# Patient Record
Sex: MALE | Race: WHITE | Employment: OTHER | URBAN - METROPOLITAN AREA
[De-identification: names, ages, dates, MRNs, and addresses within clinical notes are randomized per-mention and may not be internally consistent; named-entity substitution may affect disease eponyms.]

---

## 2017-03-10 ENCOUNTER — OFFICE VISIT (OUTPATIENT)
Dept: FAMILY MEDICINE CLINIC | Facility: CLINIC | Age: 47
End: 2017-03-10

## 2017-03-10 VITALS
WEIGHT: 299 LBS | BODY MASS INDEX: 43 KG/M2 | RESPIRATION RATE: 16 BRPM | TEMPERATURE: 98 F | HEART RATE: 76 BPM | SYSTOLIC BLOOD PRESSURE: 128 MMHG | DIASTOLIC BLOOD PRESSURE: 86 MMHG

## 2017-03-10 DIAGNOSIS — R05.3 PERSISTENT COUGH: Primary | ICD-10-CM

## 2017-03-10 DIAGNOSIS — R50.9 FEVER, UNSPECIFIED FEVER CAUSE: ICD-10-CM

## 2017-03-10 PROCEDURE — 99213 OFFICE O/P EST LOW 20 MIN: CPT | Performed by: FAMILY MEDICINE

## 2017-03-10 RX ORDER — ACYCLOVIR 200 MG/1
200 CAPSULE ORAL
Qty: 25 CAPSULE | Refills: 1 | Status: SHIPPED | OUTPATIENT
Start: 2017-03-10 | End: 2017-12-05 | Stop reason: ALTCHOICE

## 2017-03-10 RX ORDER — ACYCLOVIR 200 MG/1
200 CAPSULE ORAL
COMMUNITY
End: 2017-03-10

## 2017-03-10 NOTE — PROGRESS NOTES
Pablo Epley is a 48year old male. HPI:   Patient states that he has had a dry cough for approximately 2-1/2 weeks. He said it started with an upper respiratory infection and low-grade fever. Cough can interfere with his talking.   He asked for refill this Visit:  Signed Prescriptions Disp Refills    acyclovir 200 MG Oral Cap 25 capsule 1      Sig: Take 1 capsule (200 mg total) by mouth 5 (five) times daily.       Mometasone Furoate (ASMANEX 60 METERED DOSES) 220 MCG/INH Inhalation Aerosol Powder, Breath

## 2017-03-24 ENCOUNTER — OFFICE VISIT (OUTPATIENT)
Dept: FAMILY MEDICINE CLINIC | Facility: CLINIC | Age: 47
End: 2017-03-24

## 2017-03-24 VITALS
BODY MASS INDEX: 43.23 KG/M2 | WEIGHT: 302 LBS | RESPIRATION RATE: 16 BRPM | DIASTOLIC BLOOD PRESSURE: 80 MMHG | TEMPERATURE: 98 F | SYSTOLIC BLOOD PRESSURE: 120 MMHG | HEART RATE: 96 BPM | HEIGHT: 70 IN

## 2017-03-24 DIAGNOSIS — J02.9 PHARYNGITIS, UNSPECIFIED ETIOLOGY: Primary | ICD-10-CM

## 2017-03-24 LAB — CONTROL LINE PRESENT WITH A CLEAR BACKGROUND (YES/NO): YES YES/NO

## 2017-03-24 PROCEDURE — 99213 OFFICE O/P EST LOW 20 MIN: CPT | Performed by: FAMILY MEDICINE

## 2017-03-24 PROCEDURE — 87880 STREP A ASSAY W/OPTIC: CPT | Performed by: FAMILY MEDICINE

## 2017-03-24 PROCEDURE — 87081 CULTURE SCREEN ONLY: CPT | Performed by: FAMILY MEDICINE

## 2017-03-24 NOTE — PROGRESS NOTES
HPI:   Lilly Salcido is a 48year old male who presents for upper respiratory symptoms for  5  days. Patient reports sore throat, congestion, denies fever, denies cough, denies sinus pain.       Current Outpatient Prescriptions:  acyclovir 200 MG Oral Cap T are clear  HEENT: atraumatic, normocephalic,ears and throat are clear; no maxillary and frontal sinus tenderness  NECK: supple,no adenopathy  LUNGS: clear to auscultation, no r/r/w  CARDIO: RRR without murmur  GI: good BS's,no masses, HSM or tenderness

## 2017-03-24 NOTE — PATIENT INSTRUCTIONS
Pharyngitis (Sore Throat), Report Pending    Pharyngitis (sore throat) is often due to a virus. It can also be caused by the streptococcus, or strep, bacterium, often called strep throat.  Both viral and strep infections can cause throat pain that is wors · For children: Use acetaminophen for fever, fussiness, or discomfort.  In infants older than 10months of age, you may use ibuprofen instead of acetaminophen. Talk with your child's healthcare provider before giving these medicines if your child has chronic · Signs of dehydration (very dark urine or no urine, sunken eyes, dizziness)  · Trouble breathing or noisy breathing  · Muffled voice  · New rash  · Child appears to be getting sicker  Date Last Reviewed: 4/13/2015  © 1273-6211 [employer]. 7

## 2017-10-04 ENCOUNTER — TELEPHONE (OUTPATIENT)
Dept: FAMILY MEDICINE CLINIC | Facility: CLINIC | Age: 47
End: 2017-10-04

## 2017-10-04 DIAGNOSIS — Z13.0 SCREENING FOR ENDOCRINE, METABOLIC AND IMMUNITY DISORDER: ICD-10-CM

## 2017-10-04 DIAGNOSIS — Z13.220 SCREENING FOR LIPOID DISORDERS: ICD-10-CM

## 2017-10-04 DIAGNOSIS — Z13.29 SCREENING FOR ENDOCRINE, METABOLIC AND IMMUNITY DISORDER: ICD-10-CM

## 2017-10-04 DIAGNOSIS — Z13.228 SCREENING FOR ENDOCRINE, METABOLIC AND IMMUNITY DISORDER: ICD-10-CM

## 2017-10-04 DIAGNOSIS — Z12.5 SPECIAL SCREENING FOR MALIGNANT NEOPLASM OF PROSTATE: ICD-10-CM

## 2017-10-04 DIAGNOSIS — Z13.0 SCREENING FOR IRON DEFICIENCY ANEMIA: Primary | ICD-10-CM

## 2017-10-04 DIAGNOSIS — Z13.89 SCREENING FOR GENITOURINARY CONDITION: ICD-10-CM

## 2017-10-04 NOTE — TELEPHONE ENCOUNTER
Pt wife, Lexii Moran (on pt HIPAA), set up a physical for pt 12/5/17. Pt would like to complete labs prior to appt - please place needed labs and please contact pt/wife to let them know when orders are placed and if they are fasting.  Labs can be ordered through

## 2017-10-04 NOTE — TELEPHONE ENCOUNTER
I have pended labs: cbc, cmp, lipid, psa, and u/a (labs Dr Marcello Brooke does for px's)    Please sign if you agree. Thanks.

## 2017-12-05 ENCOUNTER — OFFICE VISIT (OUTPATIENT)
Dept: FAMILY MEDICINE CLINIC | Facility: CLINIC | Age: 47
End: 2017-12-05

## 2017-12-05 VITALS
BODY MASS INDEX: 45.61 KG/M2 | DIASTOLIC BLOOD PRESSURE: 80 MMHG | HEIGHT: 69.5 IN | SYSTOLIC BLOOD PRESSURE: 130 MMHG | TEMPERATURE: 98 F | RESPIRATION RATE: 16 BRPM | HEART RATE: 80 BPM | WEIGHT: 315 LBS

## 2017-12-05 DIAGNOSIS — Z13.220 SCREENING FOR LIPOID DISORDERS: ICD-10-CM

## 2017-12-05 DIAGNOSIS — Z13.228 SCREENING FOR ENDOCRINE, METABOLIC AND IMMUNITY DISORDER: ICD-10-CM

## 2017-12-05 DIAGNOSIS — E66.01 MORBID OBESITY DUE TO EXCESS CALORIES (HCC): ICD-10-CM

## 2017-12-05 DIAGNOSIS — F41.8 ANXIETY WITH DEPRESSION: ICD-10-CM

## 2017-12-05 DIAGNOSIS — Z12.5 SPECIAL SCREENING FOR MALIGNANT NEOPLASM OF PROSTATE: ICD-10-CM

## 2017-12-05 DIAGNOSIS — Z13.0 SCREENING FOR IRON DEFICIENCY ANEMIA: ICD-10-CM

## 2017-12-05 DIAGNOSIS — Z13.29 SCREENING FOR ENDOCRINE, METABOLIC AND IMMUNITY DISORDER: ICD-10-CM

## 2017-12-05 DIAGNOSIS — Z00.00 ANNUAL PHYSICAL EXAM: Primary | ICD-10-CM

## 2017-12-05 DIAGNOSIS — Z13.89 SCREENING FOR GENITOURINARY CONDITION: ICD-10-CM

## 2017-12-05 DIAGNOSIS — G47.33 OSA (OBSTRUCTIVE SLEEP APNEA): ICD-10-CM

## 2017-12-05 DIAGNOSIS — Z13.0 SCREENING FOR ENDOCRINE, METABOLIC AND IMMUNITY DISORDER: ICD-10-CM

## 2017-12-05 PROCEDURE — 99396 PREV VISIT EST AGE 40-64: CPT | Performed by: FAMILY MEDICINE

## 2017-12-05 NOTE — PROGRESS NOTES
Liz Cline is a 50year old male who presents for a complete physical exam.   HPI:   Pt complains of nothing new. Stopped using his dental appliance for MIRIAM. . States it kept falling out. Discussed the benefit of adequate treatment for MIRIAM.   Patient wi Diagnosis Date   • Anxiety state, unspecified    • Obesity, unspecified    • Unspecified sleep apnea        PAST SURGICAL HISTORY:     Past Surgical History:  1998: APPENDECTOMY  1985: KNEE SCOPE/SURG/INCOND FX AID+FIXAT      Comment: knee arthroscopy Pulse exam Right:  pedal 2+. Pulse exam Left:    pedal 2+.    Pretibial edema: No lymphadema in the legs or feet:  GI: good BS's,no masses, HSM or tenderness  : two descended testes,no masses,no hernia,no penile lesions  RECTAL: good rectal t

## 2017-12-07 ENCOUNTER — LAB ENCOUNTER (OUTPATIENT)
Dept: LAB | Age: 47
End: 2017-12-07
Attending: FAMILY MEDICINE
Payer: COMMERCIAL

## 2017-12-07 DIAGNOSIS — Z13.0 SCREENING FOR ENDOCRINE, METABOLIC AND IMMUNITY DISORDER: ICD-10-CM

## 2017-12-07 DIAGNOSIS — Z12.5 SPECIAL SCREENING FOR MALIGNANT NEOPLASM OF PROSTATE: ICD-10-CM

## 2017-12-07 DIAGNOSIS — Z13.220 SCREENING FOR LIPOID DISORDERS: ICD-10-CM

## 2017-12-07 DIAGNOSIS — Z13.0 SCREENING FOR IRON DEFICIENCY ANEMIA: ICD-10-CM

## 2017-12-07 DIAGNOSIS — Z13.89 SCREENING FOR GENITOURINARY CONDITION: ICD-10-CM

## 2017-12-07 DIAGNOSIS — Z13.29 SCREENING FOR ENDOCRINE, METABOLIC AND IMMUNITY DISORDER: ICD-10-CM

## 2017-12-07 DIAGNOSIS — E66.01 MORBID OBESITY DUE TO EXCESS CALORIES (HCC): ICD-10-CM

## 2017-12-07 DIAGNOSIS — Z13.228 SCREENING FOR ENDOCRINE, METABOLIC AND IMMUNITY DISORDER: ICD-10-CM

## 2017-12-07 PROBLEM — G47.33 OSA (OBSTRUCTIVE SLEEP APNEA): Status: ACTIVE | Noted: 2017-12-07

## 2017-12-07 PROBLEM — F41.8 ANXIETY WITH DEPRESSION: Status: ACTIVE | Noted: 2017-12-07

## 2017-12-07 PROCEDURE — 80053 COMPREHEN METABOLIC PANEL: CPT | Performed by: FAMILY MEDICINE

## 2017-12-07 PROCEDURE — 85025 COMPLETE CBC W/AUTO DIFF WBC: CPT | Performed by: FAMILY MEDICINE

## 2017-12-07 PROCEDURE — 81003 URINALYSIS AUTO W/O SCOPE: CPT | Performed by: FAMILY MEDICINE

## 2017-12-07 PROCEDURE — 84153 ASSAY OF PSA TOTAL: CPT | Performed by: FAMILY MEDICINE

## 2017-12-07 PROCEDURE — 36415 COLL VENOUS BLD VENIPUNCTURE: CPT | Performed by: FAMILY MEDICINE

## 2017-12-07 PROCEDURE — 80061 LIPID PANEL: CPT | Performed by: FAMILY MEDICINE

## 2017-12-08 DIAGNOSIS — R74.8 ELEVATED LIVER ENZYMES: Primary | ICD-10-CM

## 2018-02-07 ENCOUNTER — APPOINTMENT (OUTPATIENT)
Dept: LAB | Age: 48
End: 2018-02-07
Attending: FAMILY MEDICINE
Payer: COMMERCIAL

## 2018-02-07 DIAGNOSIS — R74.8 ELEVATED LIVER ENZYMES: ICD-10-CM

## 2018-02-07 LAB
ALBUMIN SERPL-MCNC: 3.8 G/DL (ref 3.5–4.8)
ALP LIVER SERPL-CCNC: 56 U/L (ref 45–117)
ALT SERPL-CCNC: 63 U/L (ref 17–63)
AST SERPL-CCNC: 27 U/L (ref 15–41)
BILIRUB DIRECT SERPL-MCNC: 0.1 MG/DL (ref 0.1–0.5)
BILIRUB SERPL-MCNC: 0.7 MG/DL (ref 0.1–2)
M PROTEIN MFR SERPL ELPH: 7.9 G/DL (ref 6.1–8.3)

## 2018-02-07 PROCEDURE — 80076 HEPATIC FUNCTION PANEL: CPT | Performed by: FAMILY MEDICINE

## 2018-02-07 PROCEDURE — 36415 COLL VENOUS BLD VENIPUNCTURE: CPT | Performed by: FAMILY MEDICINE

## 2018-03-14 ENCOUNTER — HOSPITAL ENCOUNTER (OUTPATIENT)
Dept: ULTRASOUND IMAGING | Facility: HOSPITAL | Age: 48
Discharge: HOME | End: 2018-03-14
Attending: NURSE PRACTITIONER
Payer: COMMERCIAL

## 2018-03-14 ENCOUNTER — OFFICE VISIT (OUTPATIENT)
Dept: FAMILY MEDICINE CLINIC | Facility: CLINIC | Age: 48
End: 2018-03-14

## 2018-03-14 ENCOUNTER — TELEPHONE (OUTPATIENT)
Dept: FAMILY MEDICINE CLINIC | Facility: CLINIC | Age: 48
End: 2018-03-14

## 2018-03-14 VITALS
RESPIRATION RATE: 18 BRPM | HEART RATE: 82 BPM | HEIGHT: 69.5 IN | WEIGHT: 315 LBS | OXYGEN SATURATION: 98 % | BODY MASS INDEX: 45.61 KG/M2 | DIASTOLIC BLOOD PRESSURE: 76 MMHG | SYSTOLIC BLOOD PRESSURE: 123 MMHG | TEMPERATURE: 98 F

## 2018-03-14 DIAGNOSIS — R10.11 RIGHT UPPER QUADRANT ABDOMINAL PAIN: Primary | ICD-10-CM

## 2018-03-14 DIAGNOSIS — R10.11 RIGHT UPPER QUADRANT ABDOMINAL PAIN: ICD-10-CM

## 2018-03-14 PROCEDURE — 99214 OFFICE O/P EST MOD 30 MIN: CPT | Performed by: NURSE PRACTITIONER

## 2018-03-14 PROCEDURE — 76700 US EXAM ABDOM COMPLETE: CPT | Performed by: NURSE PRACTITIONER

## 2018-03-14 NOTE — PROGRESS NOTES
Otoniel Echols is a 50year old male. HPI:   Patient presents today reporting a 2 week history of right upper quadrant abdominal pain. He reports that he ate [employer] today for lunch and noticed the pain right after he ate.  He reports other times 2 weeks. Reports nausea. Denies vomiting. Denies any diarrhea or constipation.   NEURO: denies headaches  EXAM:   /76   Pulse 82   Temp 98.4 °F (36.9 °C) (Oral)   Resp 18   Ht 69.5\"   Wt (!) 319 lb   SpO2 98%   BMI 46.43 kg/m²   GENERAL: well develop METABOLIC PANEL (14); Future  - LIPASE; Future  - URINALYSIS, ROUTINE; Future  - NM GB HEPATOBILIARY SCAN WITH PHARMACY  (CPT=78227); Future    The patient indicates understanding of these issues and agrees to the plan.  Await results for further plan of ca

## 2018-03-15 ENCOUNTER — TELEPHONE (OUTPATIENT)
Dept: FAMILY MEDICINE CLINIC | Facility: CLINIC | Age: 48
End: 2018-03-15

## 2018-03-15 ENCOUNTER — HOSPITAL ENCOUNTER (OUTPATIENT)
Dept: LAB | Facility: HOSPITAL | Age: 48
Discharge: HOME | End: 2018-03-15
Attending: NURSE PRACTITIONER
Payer: COMMERCIAL

## 2018-03-15 ENCOUNTER — HOSPITAL ENCOUNTER (OUTPATIENT)
Dept: NUCLEAR MEDICINE | Facility: HOSPITAL | Age: 48
Discharge: HOME | End: 2018-03-15
Attending: NURSE PRACTITIONER
Payer: COMMERCIAL

## 2018-03-15 DIAGNOSIS — R10.11 RIGHT UPPER QUADRANT ABDOMINAL PAIN: ICD-10-CM

## 2018-03-15 LAB
ALBUMIN SERPL-MCNC: 3.9 G/DL (ref 3.5–4.8)
ALP LIVER SERPL-CCNC: 55 U/L (ref 45–117)
ALT SERPL-CCNC: 59 U/L (ref 17–63)
AST SERPL-CCNC: 31 U/L (ref 15–41)
BASOPHILS # BLD AUTO: 0.06 X10(3) UL (ref 0–0.1)
BASOPHILS NFR BLD AUTO: 0.9 %
BILIRUB SERPL-MCNC: 0.6 MG/DL (ref 0.1–2)
BILIRUB UR QL STRIP.AUTO: NEGATIVE
BUN BLD-MCNC: 12 MG/DL (ref 8–20)
CALCIUM BLD-MCNC: 9.6 MG/DL (ref 8.3–10.3)
CHLORIDE: 107 MMOL/L (ref 101–111)
CO2: 24 MMOL/L (ref 22–32)
COLOR UR AUTO: YELLOW
CREAT BLD-MCNC: 0.74 MG/DL (ref 0.7–1.3)
EOSINOPHIL # BLD AUTO: 0.24 X10(3) UL (ref 0–0.3)
EOSINOPHIL NFR BLD AUTO: 3.8 %
ERYTHROCYTE [DISTWIDTH] IN BLOOD BY AUTOMATED COUNT: 12.3 % (ref 11.5–16)
GLUCOSE BLD-MCNC: 82 MG/DL (ref 70–99)
GLUCOSE UR STRIP.AUTO-MCNC: NEGATIVE MG/DL
HCT VFR BLD AUTO: 43.8 % (ref 37–53)
HGB BLD-MCNC: 15.6 G/DL (ref 13–17)
IMMATURE GRANULOCYTE COUNT: 0.02 X10(3) UL (ref 0–1)
IMMATURE GRANULOCYTE RATIO %: 0.3 %
KETONES UR STRIP.AUTO-MCNC: NEGATIVE MG/DL
LEUKOCYTE ESTERASE UR QL STRIP.AUTO: NEGATIVE
LIPASE: 165 U/L (ref 73–393)
LYMPHOCYTES # BLD AUTO: 2.61 X10(3) UL (ref 0.9–4)
LYMPHOCYTES NFR BLD AUTO: 40.8 %
M PROTEIN MFR SERPL ELPH: 8 G/DL (ref 6.1–8.3)
MCH RBC QN AUTO: 31.6 PG (ref 27–33.2)
MCHC RBC AUTO-ENTMCNC: 35.6 G/DL (ref 31–37)
MCV RBC AUTO: 88.8 FL (ref 80–99)
MONOCYTES # BLD AUTO: 0.47 X10(3) UL (ref 0.1–1)
MONOCYTES NFR BLD AUTO: 7.3 %
NEUTROPHIL ABS PRELIM: 3 X10 (3) UL (ref 1.3–6.7)
NEUTROPHILS # BLD AUTO: 3 X10(3) UL (ref 1.3–6.7)
NEUTROPHILS NFR BLD AUTO: 46.9 %
NITRITE UR QL STRIP.AUTO: NEGATIVE
PH UR STRIP.AUTO: 5 [PH] (ref 4.5–8)
PLATELET # BLD AUTO: 193 10(3)UL (ref 150–450)
POTASSIUM SERPL-SCNC: 4.1 MMOL/L (ref 3.6–5.1)
PROT UR STRIP.AUTO-MCNC: NEGATIVE MG/DL
RBC # BLD AUTO: 4.93 X10(6)UL (ref 4.3–5.7)
RBC UR QL AUTO: NEGATIVE
RED CELL DISTRIBUTION WIDTH-SD: 39.9 FL (ref 35.1–46.3)
SODIUM SERPL-SCNC: 139 MMOL/L (ref 136–144)
SP GR UR STRIP.AUTO: 1.02 (ref 1–1.03)
UROBILINOGEN UR STRIP.AUTO-MCNC: <2 MG/DL
WBC # BLD AUTO: 6.4 X10(3) UL (ref 4–13)

## 2018-03-15 PROCEDURE — 36415 COLL VENOUS BLD VENIPUNCTURE: CPT

## 2018-03-15 PROCEDURE — 83690 ASSAY OF LIPASE: CPT

## 2018-03-15 PROCEDURE — 81003 URINALYSIS AUTO W/O SCOPE: CPT

## 2018-03-15 PROCEDURE — 85025 COMPLETE CBC W/AUTO DIFF WBC: CPT

## 2018-03-15 PROCEDURE — 80053 COMPREHEN METABOLIC PANEL: CPT

## 2018-03-15 PROCEDURE — 78227 HEPATOBIL SYST IMAGE W/DRUG: CPT | Performed by: NURSE PRACTITIONER

## 2018-03-15 NOTE — TELEPHONE ENCOUNTER
MARIZOL Barron Netaide Barnetta Netaide Barnetta Netaide [facility] w/Radiology called to share pt's result is ready (liver scan). Per [facility], test was a Nuc Medicine Scan.   [facility] is aware Hamilton Weir is out of the office today and back tomorrow (Fri 3/16)  and as long as Hamilton Weir get this information kiera

## 2018-03-15 NOTE — TELEPHONE ENCOUNTER
I spoke with pt last night re: abdominal US results. I discussed with him having lab work completed. I did also place an order for a HIDA scan to be completed to further evaluate the gallbladder-can you call and update him on this.  Can you also inquire re:

## 2018-03-15 NOTE — TELEPHONE ENCOUNTER
Wife calling; per Central Scheduling they asked her to call PCP to see if this test can be ordered stat. Call transferred to triage.

## 2018-03-15 NOTE — TELEPHONE ENCOUNTER
Wife Alexander Mims , advised of recommendations, with verbalized understanding   Per wife pt took   2 tabs of 200 mg Ibu only last night  But none that she can recall (at least for the last couple of weeks)   Ciaran

## 2018-03-16 ENCOUNTER — TELEPHONE (OUTPATIENT)
Dept: FAMILY MEDICINE CLINIC | Facility: CLINIC | Age: 48
End: 2018-03-16

## 2018-03-19 ENCOUNTER — OFFICE VISIT (OUTPATIENT)
Dept: SURGERY | Facility: CLINIC | Age: 48
End: 2018-03-19

## 2018-03-19 VITALS
BODY MASS INDEX: 45.1 KG/M2 | HEIGHT: 70 IN | DIASTOLIC BLOOD PRESSURE: 82 MMHG | SYSTOLIC BLOOD PRESSURE: 116 MMHG | TEMPERATURE: 98 F | WEIGHT: 315 LBS | HEART RATE: 64 BPM

## 2018-03-19 DIAGNOSIS — K82.8 BILIARY DYSKINESIA: Primary | ICD-10-CM

## 2018-03-19 DIAGNOSIS — G47.33 OSA (OBSTRUCTIVE SLEEP APNEA): ICD-10-CM

## 2018-03-19 DIAGNOSIS — E66.01 MORBID OBESITY DUE TO EXCESS CALORIES (HCC): ICD-10-CM

## 2018-03-19 PROCEDURE — 99243 OFF/OP CNSLTJ NEW/EST LOW 30: CPT | Performed by: SURGERY

## 2018-03-19 NOTE — H&P
New Patient Visit Note       Active Problems      1. Biliary dyskinesia    2. MIRIAM (obstructive sleep apnea)    3.  Morbid obesity due to excess calories [facility])        Chief Complaint   Patient presents with:  Gallbladder: 2 weeks ago started with RUQ abdomin Concern  Caffeine Concern        No  Exercise                No         Current Outpatient Prescriptions:  BusPIRone HCl 15 MG Oral Tab 1 Tab daily. Disp:  Rfl:    FLUoxetine HCl 20 MG Oral Cap 1 Cap daily.  Disp:  Rfl:    Multiple Vitamin (MULTIVITAMINS OR Assessment   Biliary dyskinesia  (primary encounter diagnosis)  MIRIAM (obstructive sleep apnea)  Morbid obesity due to excess calories (HCC)    Pt with biliary dyskinesia (EF of 0% with reproduction of symptoms with CCK).       Plan   · Plan for a laparos

## 2018-05-07 RX ORDER — ACYCLOVIR 200 MG/1
CAPSULE ORAL
Qty: 25 CAPSULE | Refills: 3 | Status: SHIPPED | OUTPATIENT
Start: 2018-05-07 | End: 2018-11-06 | Stop reason: ALTCHOICE

## 2018-09-05 ENCOUNTER — TELEPHONE (OUTPATIENT)
Dept: FAMILY MEDICINE CLINIC | Facility: CLINIC | Age: 48
End: 2018-09-05

## 2018-09-05 DIAGNOSIS — Z13.0 SCREENING FOR ENDOCRINE, METABOLIC AND IMMUNITY DISORDER: Primary | ICD-10-CM

## 2018-09-05 DIAGNOSIS — Z13.89 SCREENING FOR GENITOURINARY CONDITION: ICD-10-CM

## 2018-09-05 DIAGNOSIS — Z12.5 SPECIAL SCREENING FOR MALIGNANT NEOPLASM OF PROSTATE: ICD-10-CM

## 2018-09-05 DIAGNOSIS — Z13.29 SCREENING FOR ENDOCRINE, METABOLIC AND IMMUNITY DISORDER: Primary | ICD-10-CM

## 2018-09-05 DIAGNOSIS — Z13.220 SCREENING FOR LIPOID DISORDERS: ICD-10-CM

## 2018-09-05 DIAGNOSIS — Z13.0 SCREENING FOR IRON DEFICIENCY ANEMIA: ICD-10-CM

## 2018-09-05 DIAGNOSIS — Z13.228 SCREENING FOR ENDOCRINE, METABOLIC AND IMMUNITY DISORDER: Primary | ICD-10-CM

## 2018-09-05 NOTE — TELEPHONE ENCOUNTER
Per chart last px was 12/5/2017, labs done 12/7/2017 technically too soon for px and labs if he is obtaining this years in November 11/6/18. Will insurance cover a new px/labs prior to 365 from previous one? Unless this is new insurance from last year?   L

## 2018-09-05 NOTE — TELEPHONE ENCOUNTER
Pt's wife called and made a physical appt with  on 11/6/18. She wants to know if there is any blood work to do before the physical. Please let pt know.

## 2018-09-07 NOTE — TELEPHONE ENCOUNTER
S/w wife. She states she confirmed with insurance in regards to him having px and labs done sooner than one year from his last one. I have pended labs for his px 11/6  Please sign thanks.

## 2018-10-29 ENCOUNTER — OFFICE VISIT (OUTPATIENT)
Dept: FAMILY MEDICINE CLINIC | Facility: CLINIC | Age: 48
End: 2018-10-29
Payer: COMMERCIAL

## 2018-10-29 VITALS
TEMPERATURE: 98 F | BODY MASS INDEX: 44.67 KG/M2 | WEIGHT: 312 LBS | HEIGHT: 70 IN | DIASTOLIC BLOOD PRESSURE: 90 MMHG | SYSTOLIC BLOOD PRESSURE: 132 MMHG | HEART RATE: 76 BPM | RESPIRATION RATE: 18 BRPM

## 2018-10-29 DIAGNOSIS — R10.9 ACUTE RIGHT FLANK PAIN: Primary | ICD-10-CM

## 2018-10-29 PROCEDURE — 81003 URINALYSIS AUTO W/O SCOPE: CPT | Performed by: FAMILY MEDICINE

## 2018-10-29 PROCEDURE — 99213 OFFICE O/P EST LOW 20 MIN: CPT | Performed by: FAMILY MEDICINE
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
## 2018-10-29 NOTE — PROGRESS NOTES
I have called On license of [facility] via referral grid to obtain authorization for pt's STAT test.  I s/w Ladi Valentino who informed me they do not have pt's insurance ID# on file and that his name/ is showing an  plan?  She advised me to have pt contact his insurance to

## 2018-10-31 ENCOUNTER — MED REC SCAN ONLY (OUTPATIENT)
Dept: FAMILY MEDICINE CLINIC | Facility: CLINIC | Age: 48
End: 2018-10-31

## 2018-11-06 ENCOUNTER — OFFICE VISIT (OUTPATIENT)
Dept: FAMILY MEDICINE CLINIC | Facility: CLINIC | Age: 48
End: 2018-11-06
Payer: COMMERCIAL

## 2018-11-06 VITALS
WEIGHT: 308 LBS | TEMPERATURE: 98 F | HEIGHT: 70 IN | SYSTOLIC BLOOD PRESSURE: 122 MMHG | BODY MASS INDEX: 44.09 KG/M2 | HEART RATE: 72 BPM | RESPIRATION RATE: 16 BRPM | DIASTOLIC BLOOD PRESSURE: 84 MMHG

## 2018-11-06 DIAGNOSIS — Z00.00 ANNUAL PHYSICAL EXAM: Primary | ICD-10-CM

## 2018-11-06 DIAGNOSIS — E66.01 MORBID OBESITY DUE TO EXCESS CALORIES (HCC): ICD-10-CM

## 2018-11-06 DIAGNOSIS — Z12.11 SCREENING FOR COLON CANCER: ICD-10-CM

## 2018-11-06 DIAGNOSIS — F41.8 ANXIETY WITH DEPRESSION: ICD-10-CM

## 2018-11-06 PROCEDURE — 99396 PREV VISIT EST AGE 40-64: CPT | Performed by: FAMILY MEDICINE

## 2019-11-01 ENCOUNTER — OFFICE VISIT (OUTPATIENT)
Dept: FAMILY MEDICINE CLINIC | Facility: CLINIC | Age: 49
End: 2019-11-01
Payer: COMMERCIAL

## 2019-11-01 VITALS
DIASTOLIC BLOOD PRESSURE: 86 MMHG | TEMPERATURE: 98 F | WEIGHT: 315 LBS | BODY MASS INDEX: 45.1 KG/M2 | RESPIRATION RATE: 16 BRPM | SYSTOLIC BLOOD PRESSURE: 136 MMHG | HEIGHT: 70 IN | HEART RATE: 96 BPM

## 2019-11-01 DIAGNOSIS — Z00.00 ANNUAL PHYSICAL EXAM: Primary | ICD-10-CM

## 2019-11-01 DIAGNOSIS — Z13.89 SCREENING FOR GENITOURINARY CONDITION: ICD-10-CM

## 2019-11-01 DIAGNOSIS — Z13.220 SCREENING FOR LIPOID DISORDERS: ICD-10-CM

## 2019-11-01 DIAGNOSIS — Z13.0 SCREENING FOR IRON DEFICIENCY ANEMIA: ICD-10-CM

## 2019-11-01 DIAGNOSIS — G47.33 OSA (OBSTRUCTIVE SLEEP APNEA): ICD-10-CM

## 2019-11-01 DIAGNOSIS — Z13.0 SCREENING FOR ENDOCRINE, METABOLIC AND IMMUNITY DISORDER: ICD-10-CM

## 2019-11-01 DIAGNOSIS — F41.8 ANXIETY WITH DEPRESSION: ICD-10-CM

## 2019-11-01 DIAGNOSIS — Z12.5 SPECIAL SCREENING FOR MALIGNANT NEOPLASM OF PROSTATE: ICD-10-CM

## 2019-11-01 DIAGNOSIS — E66.01 MORBID OBESITY DUE TO EXCESS CALORIES (HCC): ICD-10-CM

## 2019-11-01 DIAGNOSIS — K82.8 BILIARY DYSKINESIA: ICD-10-CM

## 2019-11-01 DIAGNOSIS — Z13.29 SCREENING FOR ENDOCRINE, METABOLIC AND IMMUNITY DISORDER: ICD-10-CM

## 2019-11-01 DIAGNOSIS — Z13.228 SCREENING FOR ENDOCRINE, METABOLIC AND IMMUNITY DISORDER: ICD-10-CM

## 2019-11-01 PROCEDURE — 81003 URINALYSIS AUTO W/O SCOPE: CPT | Performed by: FAMILY MEDICINE

## 2019-11-01 PROCEDURE — 99396 PREV VISIT EST AGE 40-64: CPT | Performed by: FAMILY MEDICINE

## 2019-11-01 NOTE — PROGRESS NOTES
Franky Campos is a 44year old male here for a CPE  HPI:   Pt complains of nothing new. Believes the BuSpar and fluoxetine remain helpful for his anxiety. Discussed the patient's weight at length. Patient has not been exercising regularly recently.   Pa nourished,in no apparent distress  SKIN: no rashes,no suspicious lesions  HEENT: atraumatic, normocephalic,ears and throat are clear  NECK: supple,no adenopathy,no bruits  LUNGS: clear to auscultation  CARDIO: RRR without murmur  GI: good BS's,no masses, H

## 2019-11-05 ENCOUNTER — LAB ENCOUNTER (OUTPATIENT)
Dept: LAB | Age: 49
End: 2019-11-05
Attending: FAMILY MEDICINE
Payer: COMMERCIAL

## 2019-11-05 DIAGNOSIS — Z13.0 SCREENING FOR ENDOCRINE, METABOLIC AND IMMUNITY DISORDER: ICD-10-CM

## 2019-11-05 DIAGNOSIS — Z12.5 SPECIAL SCREENING FOR MALIGNANT NEOPLASM OF PROSTATE: ICD-10-CM

## 2019-11-05 DIAGNOSIS — Z13.29 SCREENING FOR ENDOCRINE, METABOLIC AND IMMUNITY DISORDER: ICD-10-CM

## 2019-11-05 DIAGNOSIS — Z13.0 SCREENING FOR IRON DEFICIENCY ANEMIA: ICD-10-CM

## 2019-11-05 DIAGNOSIS — Z13.220 SCREENING FOR LIPOID DISORDERS: ICD-10-CM

## 2019-11-05 DIAGNOSIS — Z13.228 SCREENING FOR ENDOCRINE, METABOLIC AND IMMUNITY DISORDER: ICD-10-CM

## 2019-11-05 PROCEDURE — 80061 LIPID PANEL: CPT | Performed by: FAMILY MEDICINE

## 2019-11-05 PROCEDURE — 84153 ASSAY OF PSA TOTAL: CPT | Performed by: FAMILY MEDICINE

## 2019-11-05 PROCEDURE — 80053 COMPREHEN METABOLIC PANEL: CPT | Performed by: FAMILY MEDICINE

## 2019-11-05 PROCEDURE — 36415 COLL VENOUS BLD VENIPUNCTURE: CPT | Performed by: FAMILY MEDICINE

## 2019-11-05 PROCEDURE — 85025 COMPLETE CBC W/AUTO DIFF WBC: CPT | Performed by: FAMILY MEDICINE

## 2019-12-17 ENCOUNTER — MED REC SCAN ONLY (OUTPATIENT)
Dept: FAMILY MEDICINE CLINIC | Facility: CLINIC | Age: 49
End: 2019-12-17

## 2020-04-09 ENCOUNTER — TELEPHONE (OUTPATIENT)
Dept: FAMILY MEDICINE CLINIC | Facility: CLINIC | Age: 50
End: 2020-04-09

## 2020-04-09 NOTE — TELEPHONE ENCOUNTER
Received request for a refill of Acyclovir 200mg oral capsules. Pt has not been on this medication since 5/7/18. Pt's last OV was 11/1/19. Called to pt to see why/if he is requesting this medication. Reached tucker peacock for pt to cb.

## 2020-04-10 RX ORDER — ACYCLOVIR 200 MG/1
CAPSULE ORAL
Qty: 25 CAPSULE | Refills: 3 | Status: SHIPPED | OUTPATIENT
Start: 2020-04-10

## 2020-04-10 NOTE — TELEPHONE ENCOUNTER
Received request for refill of pt's Acyclovir 200mg caps. Last OV 11/1/19 last refill 5/7/18. This is not a protocol medication, please review and refill if appropriate.

## 2021-01-21 ENCOUNTER — MED REC SCAN ONLY (OUTPATIENT)
Dept: FAMILY MEDICINE CLINIC | Facility: CLINIC | Age: 51
End: 2021-01-21

## 2021-03-16 ENCOUNTER — OFFICE VISIT (OUTPATIENT)
Dept: FAMILY MEDICINE CLINIC | Facility: CLINIC | Age: 51
End: 2021-03-16
Payer: COMMERCIAL

## 2021-03-16 VITALS
HEIGHT: 69.49 IN | DIASTOLIC BLOOD PRESSURE: 88 MMHG | OXYGEN SATURATION: 98 % | RESPIRATION RATE: 16 BRPM | HEART RATE: 94 BPM | BODY MASS INDEX: 45.61 KG/M2 | SYSTOLIC BLOOD PRESSURE: 130 MMHG | TEMPERATURE: 97 F | WEIGHT: 315 LBS

## 2021-03-16 DIAGNOSIS — Z12.5 SPECIAL SCREENING FOR MALIGNANT NEOPLASM OF PROSTATE: ICD-10-CM

## 2021-03-16 DIAGNOSIS — Z12.11 SCREENING FOR COLON CANCER: ICD-10-CM

## 2021-03-16 DIAGNOSIS — Z00.00 ANNUAL PHYSICAL EXAM: Primary | ICD-10-CM

## 2021-03-16 DIAGNOSIS — Z13.29 SCREENING FOR ENDOCRINE, METABOLIC AND IMMUNITY DISORDER: ICD-10-CM

## 2021-03-16 DIAGNOSIS — Z13.220 SCREENING FOR LIPOID DISORDERS: ICD-10-CM

## 2021-03-16 DIAGNOSIS — E66.01 MORBID OBESITY DUE TO EXCESS CALORIES (HCC): ICD-10-CM

## 2021-03-16 DIAGNOSIS — G47.33 OBSTRUCTIVE SLEEP APNEA SYNDROME: ICD-10-CM

## 2021-03-16 DIAGNOSIS — Z13.0 SCREENING FOR IRON DEFICIENCY ANEMIA: ICD-10-CM

## 2021-03-16 DIAGNOSIS — Z13.0 SCREENING FOR ENDOCRINE, METABOLIC AND IMMUNITY DISORDER: ICD-10-CM

## 2021-03-16 DIAGNOSIS — Z13.228 SCREENING FOR ENDOCRINE, METABOLIC AND IMMUNITY DISORDER: ICD-10-CM

## 2021-03-16 DIAGNOSIS — F41.8 ANXIETY WITH DEPRESSION: ICD-10-CM

## 2021-03-16 PROCEDURE — 3008F BODY MASS INDEX DOCD: CPT | Performed by: FAMILY MEDICINE

## 2021-03-16 PROCEDURE — 99396 PREV VISIT EST AGE 40-64: CPT | Performed by: FAMILY MEDICINE

## 2021-03-16 PROCEDURE — 3079F DIAST BP 80-89 MM HG: CPT | Performed by: FAMILY MEDICINE

## 2021-03-16 PROCEDURE — 3075F SYST BP GE 130 - 139MM HG: CPT | Performed by: FAMILY MEDICINE

## 2021-03-16 NOTE — PROGRESS NOTES
Pablo Epley is a 44year old male here for a CPE  HPI:   Pt is here for his yearly physical.  He has no new complaints. .   He stated the BuSpar and fluoxetine remain helpful for his anxiety. Discussed the patient's weight at length.   Patient has not bee Wt Readings from Last 6 Encounters:  03/16/21 : (!) 316 lb 8 oz (143.6 kg)  11/11/19 : (!) 313 lb (142 kg)  11/01/19 : (!) 317 lb (143.8 kg)  11/06/18 : (!) 308 lb (139.7 kg)  10/29/18 : (!) 312 lb (141.5 kg)  03/19/18 : (!) 319 lb (144.7 kg)      GENERA

## 2021-03-17 ENCOUNTER — LAB ENCOUNTER (OUTPATIENT)
Dept: LAB | Age: 51
End: 2021-03-17
Attending: FAMILY MEDICINE
Payer: COMMERCIAL

## 2021-03-17 ENCOUNTER — HOSPITAL ENCOUNTER (OUTPATIENT)
Dept: GENERAL RADIOLOGY | Facility: HOSPITAL | Age: 51
Discharge: HOME | End: 2021-03-17
Attending: CHIROPRACTOR
Payer: COMMERCIAL

## 2021-03-17 DIAGNOSIS — G47.33 OBSTRUCTIVE SLEEP APNEA SYNDROME: ICD-10-CM

## 2021-03-17 DIAGNOSIS — Z13.228 SCREENING FOR ENDOCRINE, METABOLIC AND IMMUNITY DISORDER: ICD-10-CM

## 2021-03-17 DIAGNOSIS — M54.12 BRACHIAL NEURITIS: ICD-10-CM

## 2021-03-17 DIAGNOSIS — F41.8 ANXIETY WITH DEPRESSION: ICD-10-CM

## 2021-03-17 DIAGNOSIS — Z12.11 SCREENING FOR COLON CANCER: ICD-10-CM

## 2021-03-17 DIAGNOSIS — Z13.0 SCREENING FOR ENDOCRINE, METABOLIC AND IMMUNITY DISORDER: ICD-10-CM

## 2021-03-17 DIAGNOSIS — E66.01 MORBID OBESITY DUE TO EXCESS CALORIES (HCC): ICD-10-CM

## 2021-03-17 DIAGNOSIS — Z12.5 SPECIAL SCREENING FOR MALIGNANT NEOPLASM OF PROSTATE: ICD-10-CM

## 2021-03-17 DIAGNOSIS — Z00.00 ANNUAL PHYSICAL EXAM: ICD-10-CM

## 2021-03-17 DIAGNOSIS — Z13.220 SCREENING FOR LIPOID DISORDERS: ICD-10-CM

## 2021-03-17 DIAGNOSIS — Z13.0 SCREENING FOR IRON DEFICIENCY ANEMIA: ICD-10-CM

## 2021-03-17 DIAGNOSIS — Z13.29 SCREENING FOR ENDOCRINE, METABOLIC AND IMMUNITY DISORDER: ICD-10-CM

## 2021-03-17 LAB
ALBUMIN SERPL-MCNC: 3.7 G/DL (ref 3.4–5)
ALBUMIN/GLOB SERPL: 0.9 {RATIO} (ref 1–2)
ALP LIVER SERPL-CCNC: 60 U/L
ALT SERPL-CCNC: 47 U/L
ANION GAP SERPL CALC-SCNC: 5 MMOL/L (ref 0–18)
AST SERPL-CCNC: 19 U/L (ref 15–37)
BASOPHILS # BLD AUTO: 0.07 X10(3) UL (ref 0–0.2)
BASOPHILS NFR BLD AUTO: 0.8 %
BILIRUB SERPL-MCNC: 0.5 MG/DL (ref 0.1–2)
BILIRUB UR QL STRIP.AUTO: NEGATIVE
BUN BLD-MCNC: 13 MG/DL (ref 7–18)
BUN/CREAT SERPL: 17.6 (ref 10–20)
CALCIUM BLD-MCNC: 9.1 MG/DL (ref 8.5–10.1)
CHLORIDE SERPL-SCNC: 105 MMOL/L (ref 98–112)
CHOLEST SMN-MCNC: 163 MG/DL (ref ?–200)
CLARITY UR REFRACT.AUTO: CLEAR
CO2 SERPL-SCNC: 25 MMOL/L (ref 21–32)
COLOR UR AUTO: YELLOW
CREAT BLD-MCNC: 0.74 MG/DL
DEPRECATED RDW RBC AUTO: 42.5 FL (ref 35.1–46.3)
EOSINOPHIL # BLD AUTO: 0.28 X10(3) UL (ref 0–0.7)
EOSINOPHIL NFR BLD AUTO: 3.4 %
ERYTHROCYTE [DISTWIDTH] IN BLOOD BY AUTOMATED COUNT: 12.7 % (ref 11–15)
GLOBULIN PLAS-MCNC: 4.2 G/DL (ref 2.8–4.4)
GLUCOSE BLD-MCNC: 93 MG/DL (ref 70–99)
GLUCOSE UR STRIP.AUTO-MCNC: NEGATIVE MG/DL
HCT VFR BLD AUTO: 46 %
HDLC SERPL-MCNC: 40 MG/DL (ref 40–59)
HGB BLD-MCNC: 15.5 G/DL
IMM GRANULOCYTES # BLD AUTO: 0.02 X10(3) UL (ref 0–1)
IMM GRANULOCYTES NFR BLD: 0.2 %
KETONES UR STRIP.AUTO-MCNC: NEGATIVE MG/DL
LDLC SERPL CALC-MCNC: 107 MG/DL (ref ?–100)
LEUKOCYTE ESTERASE UR QL STRIP.AUTO: NEGATIVE
LYMPHOCYTES # BLD AUTO: 2.68 X10(3) UL (ref 1–4)
LYMPHOCYTES NFR BLD AUTO: 32.3 %
M PROTEIN MFR SERPL ELPH: 7.9 G/DL (ref 6.4–8.2)
MCH RBC QN AUTO: 30.9 PG (ref 26–34)
MCHC RBC AUTO-ENTMCNC: 33.7 G/DL (ref 31–37)
MCV RBC AUTO: 91.8 FL
MONOCYTES # BLD AUTO: 0.57 X10(3) UL (ref 0.1–1)
MONOCYTES NFR BLD AUTO: 6.9 %
NEUTROPHILS # BLD AUTO: 4.69 X10 (3) UL (ref 1.5–7.7)
NEUTROPHILS # BLD AUTO: 4.69 X10(3) UL (ref 1.5–7.7)
NEUTROPHILS NFR BLD AUTO: 56.4 %
NITRITE UR QL STRIP.AUTO: NEGATIVE
NONHDLC SERPL-MCNC: 123 MG/DL (ref ?–130)
OSMOLALITY SERPL CALC.SUM OF ELEC: 280 MOSM/KG (ref 275–295)
PATIENT FASTING Y/N/NP: YES
PATIENT FASTING Y/N/NP: YES
PH UR STRIP.AUTO: 6 [PH] (ref 5–8)
PLATELET # BLD AUTO: 228 10(3)UL (ref 150–450)
POTASSIUM SERPL-SCNC: 4 MMOL/L (ref 3.5–5.1)
PROT UR STRIP.AUTO-MCNC: NEGATIVE MG/DL
PSA SERPL-MCNC: 0.8 NG/ML (ref ?–4)
RBC # BLD AUTO: 5.01 X10(6)UL
RBC UR QL AUTO: NEGATIVE
SODIUM SERPL-SCNC: 135 MMOL/L (ref 136–145)
SP GR UR STRIP.AUTO: 1.02 (ref 1–1.03)
T4 FREE SERPL-MCNC: 1.2 NG/DL (ref 0.8–1.7)
TRIGL SERPL-MCNC: 81 MG/DL (ref 30–149)
TSI SER-ACNC: 1.77 MIU/ML (ref 0.36–3.74)
UROBILINOGEN UR STRIP.AUTO-MCNC: <2 MG/DL
VLDLC SERPL CALC-MCNC: 16 MG/DL (ref 0–30)
WBC # BLD AUTO: 8.3 X10(3) UL (ref 4–11)

## 2021-03-17 PROCEDURE — 81003 URINALYSIS AUTO W/O SCOPE: CPT | Performed by: FAMILY MEDICINE

## 2021-03-17 PROCEDURE — 72050 X-RAY EXAM NECK SPINE 4/5VWS: CPT | Performed by: CHIROPRACTOR

## 2021-03-17 PROCEDURE — 80050 GENERAL HEALTH PANEL: CPT | Performed by: FAMILY MEDICINE

## 2021-03-17 PROCEDURE — 80061 LIPID PANEL: CPT | Performed by: FAMILY MEDICINE

## 2021-03-17 PROCEDURE — 84153 ASSAY OF PSA TOTAL: CPT | Performed by: FAMILY MEDICINE

## 2021-03-17 PROCEDURE — 36415 COLL VENOUS BLD VENIPUNCTURE: CPT | Performed by: FAMILY MEDICINE

## 2021-03-17 PROCEDURE — 84439 ASSAY OF FREE THYROXINE: CPT | Performed by: FAMILY MEDICINE

## 2021-12-16 NOTE — MR AVS SNAPSHOT
[facility] [address] [address] [id number]               Thank you for choosing us for your health care visit with Mulu Dutton MD.  We are glad to serve you and happy to provide you with this [facility] Visit:  2021    Chief Complaint: pelvic pain    History of Present Illness: Glo Hester is a 19 year old  who presents to the office for ED f/u for pelvic pain, which has resolved completely.     She was seen in the ED on  for pelvic pain. Pregnancy test was negative. Pelvic US was unremarkable other than dilated pelvic veins with questionable pelvic congestion syndrome. Chlamydia was positive. She was treated in the ED preemptively in case results were positive. UA was negative.     She reports her pain onset was a week before her ED visit. Location is lower pelvic area (LLQ) to lower back. She characterizes her pain as sharp. Alleviating factors include Aleve. Aggravating factors include: nothing. The pain radiates to her lower back. The pain is intermittent. Severity is 8/10.     She has regular periods every 28-30 days. Denies dysmenorrhea. Flow is normal. Patient's last menstrual period was 2021.    Her pain is unrelated to urination. She denies dysuria and hematuria.     She reports constipation and diarrhea. She denies a relationship between her pelvic pain and bowel movements.     She denies history of dyspareunia.     She is trying to get pregnant.       Review of Systems: all systems reviewed and are negative other than pertinent positives and negatives in HPI    OB History    Para Term  AB Living   1 1 1 0 0 1   SAB IAB Ectopic Molar Multiple Live Births   0 0 0 0 0 1       ALLERGIES:   Allergen Reactions   • Fish Allergy   (Food Or Med) HIVES       Outpatient Medications Marked as Taking for the 19 encounter (Office Visit) with Jaciel Wong MD   Medication Sig Dispense Refill   • albuterol (VENTOLIN) (2.5 MG/3ML) 0.083% nebulizer solution Take 3 mLs by nebulization every 4 hours as needed for Wheezing or Shortness of Breath. Use every 4-6 hrs as needed 100 mL 3   • budesonide-formoterol (Symbicort) 160-4.5 MCG/ACT inhaler  Inhale 2 puffs into the lungs 2 times daily. 10.2 g 12   • albuterol 108 (90 Base) MCG/ACT inhaler Inhale 2 puffs into the lungs every 4 hours as needed for Shortness of Breath or Wheezing. 1 Inhaler 2       Past Medical History:   Diagnosis Date   • Amenorrhea    • Anxiety    • Asthma    • Chlamydia 04/01/2019   • GERD (gastroesophageal reflux disease)    • Hypercholesterolemia 2021   • Obesity (BMI 35.0-39.9 without comorbidity)         Past Surgical History:   Procedure Laterality Date   • Ganglion cyst excision Left 2012   • Tonsillectomy  2017       Family History   Problem Relation Age of Onset   • Cancer Maternal Grandmother         Cervical?   • Cancer, Breast Paternal Aunt         Diagnosed early 20s   • Cancer, Endometrial Neg Hx    • Cancer, Colon Neg Hx    • Cancer, Pancreatic Neg Hx      Social History     Socioeconomic History   • Marital status: Single     Spouse name: Not on file   • Number of children: Not on file   • Years of education: Not on file   • Highest education level: Not on file   Occupational History   • Not on file   Tobacco Use   • Smoking status: Never Smoker   • Smokeless tobacco: Never Used   Substance and Sexual Activity   • Alcohol use: Yes     Comment: rare   • Drug use: Never   • Sexual activity: Yes     Partners: Male     Birth control/protection: None   Other Topics Concern   • Not on file   Social History Narrative    ** Merged History Encounter **          Social Determinants of Health     Financial Resource Strain:    • Social Determinants: Financial Resource Strain: Not on file   Food Insecurity:    • Social Determinants: Food Insecurity: Not on file   Transportation Needs:    • Lack of Transportation (Medical): Not on file   • Lack of Transportation (Non-Medical): Not on file   Physical Activity:    • Days of Exercise per Week: Not on file   • Minutes of Exercise per Session: Not on file   Stress:    • Social Determinants: Stress: Not on file   Social Connections:    •  You can access your MyChart to more actively manage your health care and view more details from this visit by going to [email or web address].   If you've recently had a stay at the Hospital you can access your discharge instructions in Teddy Multani by david Social Determinants: Social Connections: Not on file   Intimate Partner Violence: Not At Risk   • Social Determinants: Intimate Partner Violence Past Fear: No   • Social Determinants: Intimate Partner Violence Current Fear: No       Objective:  Vitals:    21 1509   BP: 110/70   Pulse: 82       Physical Exam  Vitals reviewed. Exam conducted with a chaperone present.   Constitutional:       General: She is not in acute distress.     Appearance: Normal appearance. She is not ill-appearing, toxic-appearing or diaphoretic.   Pulmonary:      Effort: Pulmonary effort is normal. No respiratory distress.   Abdominal:      General: Abdomen is flat. There is no distension.      Palpations: Abdomen is soft. There is no mass.      Tenderness: There is no abdominal tenderness. There is no guarding or rebound.      Hernia: No hernia is present.   Genitourinary:     Labia:         Right: No rash, tenderness, lesion or injury.         Left: No rash, tenderness, lesion or injury.       Vagina: No signs of injury and foreign body. No vaginal discharge, erythema, tenderness, bleeding, lesions or prolapsed vaginal walls.      Cervix: Eversion present. No cervical motion tenderness, discharge, friability, lesion, erythema or cervical bleeding.      Uterus: Not deviated, not enlarged, not fixed, not tender and no uterine prolapse.       Adnexa:         Right: No mass, tenderness or fullness.          Left: No mass, tenderness or fullness.     Neurological:      Mental Status: She is alert.       Imagin21 Pelvic US  FINDINGS: Uterus measures 9.1 x 4.7 x 3.8 cm. Myometrium appears slightly  heterogenous. There are dilated peripheral uterine vessels. No evidence for  uterine fibroids. A few nabothian cysts are seen in the lower uterine  segment.     Right ovary measures 1.6 x 2.1 x 2.9 cm. Color flow is present. Arterial  and venous waveform is present.     Left ovary measures 3 .0 x 2.7 x 1.8 cm. Color flow is present.  You don’t need to join a gym. Home exercises work great.  Put more priority on exercise in your life                    Visit [facility] online at  [email or web address] Arterial  and venous waveforms are present. Suspected involuting cyst is seen in the  left ovary measuring 1.2 x 1.5 x 1.3 cm.     Prominent gonadal vessels are again seen similar as compared to prior  ultrasound.     No evidence of free fluid.        IMPRESSION:      Dilated peripheral uterine vessels with prominent gonadal vessels in the  adnexa. Findings appear similar as compared to prior ultrasound and can be  seen in underlying pelvic congestion syndrome.    Assessment/Plan: Glo Hester is a 19 year old  who presents for ED follow-up for now resolved pelvic pain.     Pelvic pain in female: She was counseled today on the multiple etiologies of pelvic pain related to the gynecologic, urinary, GI, musculoskeletal, and nervous systems. She was counseled that often psychiatric comorbidities and trauma can contribute to pelvic pain as well. A thorough history, review of systems, and examination of the back, abdomen, pelvic floor, and gynecologic organs were performed today. No abnormalities noted and her pain is resolved, so likely due to recent chlamydia infection vs premenstrual cramping. Low likelihood of pelvic congestion syndrome as suggested by Pelvic US given she does not have SX of this.   - Continue to monitor SX    Pap smear for cervical cancer screening  - PAP ORDER    Routine screening for STI (sexually transmitted infection)  - CT/GC/tric off pap   - RPR; Future  - HEPATITIS B SURFACE ANTIGEN; Future  - HEPATITIS C ANTIBODY WITH REFLEX; Future  - HIV ANTIGEN/ANTIBODY SCREEN; Future    Pre-conception counseling  - RUBELLA ANTIBODY IGG; Future  - VARICELLA ZOSTER IMMUNITY IGG; Future  - SERVICE TO GENETIC COUNSELING - carrier screening, FH of sickle cell trait   - Prenatal vitamins prescribed  - Declines flu and COVID vaccinations. Strongly recommended. Counseled on risks of flu and COVID in pregnancy.      Return to clinic PRN.     I spent a total of 28 minutes on the day of  the visit.  This includes pre-charting, chart review and documenting.    Jaciel Wong MD  Obstetrics and Gynecology

## 2022-05-23 ENCOUNTER — OFFICE VISIT (OUTPATIENT)
Dept: FAMILY MEDICINE CLINIC | Facility: CLINIC | Age: 52
End: 2022-05-23
Payer: COMMERCIAL

## 2022-05-23 ENCOUNTER — LAB ENCOUNTER (OUTPATIENT)
Dept: LAB | Age: 52
End: 2022-05-23
Attending: FAMILY MEDICINE
Payer: COMMERCIAL

## 2022-05-23 VITALS
WEIGHT: 315 LBS | RESPIRATION RATE: 16 BRPM | HEIGHT: 70 IN | TEMPERATURE: 98 F | DIASTOLIC BLOOD PRESSURE: 82 MMHG | HEART RATE: 82 BPM | SYSTOLIC BLOOD PRESSURE: 132 MMHG | BODY MASS INDEX: 45.1 KG/M2

## 2022-05-23 DIAGNOSIS — F41.8 ANXIETY WITH DEPRESSION: ICD-10-CM

## 2022-05-23 DIAGNOSIS — Z13.29 SCREENING FOR ENDOCRINE, METABOLIC AND IMMUNITY DISORDER: ICD-10-CM

## 2022-05-23 DIAGNOSIS — Z13.0 SCREENING FOR ENDOCRINE, METABOLIC AND IMMUNITY DISORDER: ICD-10-CM

## 2022-05-23 DIAGNOSIS — G47.33 OBSTRUCTIVE SLEEP APNEA SYNDROME: ICD-10-CM

## 2022-05-23 DIAGNOSIS — Z12.5 SPECIAL SCREENING FOR MALIGNANT NEOPLASM OF PROSTATE: ICD-10-CM

## 2022-05-23 DIAGNOSIS — Z13.89 SCREENING FOR GENITOURINARY CONDITION: ICD-10-CM

## 2022-05-23 DIAGNOSIS — Z13.228 SCREENING FOR ENDOCRINE, METABOLIC AND IMMUNITY DISORDER: ICD-10-CM

## 2022-05-23 DIAGNOSIS — Z00.00 ANNUAL PHYSICAL EXAM: Primary | ICD-10-CM

## 2022-05-23 DIAGNOSIS — E66.01 MORBID OBESITY DUE TO EXCESS CALORIES (HCC): ICD-10-CM

## 2022-05-23 DIAGNOSIS — Z13.220 SCREENING FOR LIPOID DISORDERS: ICD-10-CM

## 2022-05-23 DIAGNOSIS — Z13.0 SCREENING FOR IRON DEFICIENCY ANEMIA: ICD-10-CM

## 2022-05-23 LAB
ALBUMIN SERPL-MCNC: 3.8 G/DL (ref 3.4–5)
ALBUMIN/GLOB SERPL: 0.9 {RATIO} (ref 1–2)
ALP LIVER SERPL-CCNC: 57 U/L
ALT SERPL-CCNC: 40 U/L
ANION GAP SERPL CALC-SCNC: 6 MMOL/L (ref 0–18)
AST SERPL-CCNC: 20 U/L (ref 15–37)
BASOPHILS # BLD AUTO: 0.07 X10(3) UL (ref 0–0.2)
BASOPHILS NFR BLD AUTO: 0.9 %
BILIRUB SERPL-MCNC: 0.5 MG/DL (ref 0.1–2)
BILIRUB UR QL STRIP.AUTO: NEGATIVE
BUN BLD-MCNC: 14 MG/DL (ref 7–18)
CALCIUM BLD-MCNC: 9.5 MG/DL (ref 8.5–10.1)
CHLORIDE SERPL-SCNC: 103 MMOL/L (ref 98–112)
CHOLEST SERPL-MCNC: 154 MG/DL (ref ?–200)
CLARITY UR REFRACT.AUTO: CLEAR
CO2 SERPL-SCNC: 27 MMOL/L (ref 21–32)
COLOR UR AUTO: YELLOW
COMPLEXED PSA SERPL-MCNC: 1.11 NG/ML (ref ?–4)
CREAT BLD-MCNC: 0.87 MG/DL
EOSINOPHIL # BLD AUTO: 0.28 X10(3) UL (ref 0–0.7)
EOSINOPHIL NFR BLD AUTO: 3.5 %
ERYTHROCYTE [DISTWIDTH] IN BLOOD BY AUTOMATED COUNT: 13.1 %
FASTING PATIENT LIPID ANSWER: YES
FASTING STATUS PATIENT QL REPORTED: YES
GLOBULIN PLAS-MCNC: 4.3 G/DL (ref 2.8–4.4)
GLUCOSE BLD-MCNC: 94 MG/DL (ref 70–99)
GLUCOSE UR STRIP.AUTO-MCNC: NEGATIVE MG/DL
HCT VFR BLD AUTO: 46.1 %
HDLC SERPL-MCNC: 38 MG/DL (ref 40–59)
HGB BLD-MCNC: 15.3 G/DL
IMM GRANULOCYTES # BLD AUTO: 0.01 X10(3) UL (ref 0–1)
IMM GRANULOCYTES NFR BLD: 0.1 %
KETONES UR STRIP.AUTO-MCNC: NEGATIVE MG/DL
LDLC SERPL CALC-MCNC: 102 MG/DL (ref ?–100)
LEUKOCYTE ESTERASE UR QL STRIP.AUTO: NEGATIVE
LYMPHOCYTES # BLD AUTO: 2.62 X10(3) UL (ref 1–4)
LYMPHOCYTES NFR BLD AUTO: 33.1 %
MCH RBC QN AUTO: 30.6 PG (ref 26–34)
MCHC RBC AUTO-ENTMCNC: 33.2 G/DL (ref 31–37)
MCV RBC AUTO: 92.2 FL
MONOCYTES # BLD AUTO: 0.5 X10(3) UL (ref 0.1–1)
MONOCYTES NFR BLD AUTO: 6.3 %
NEUTROPHILS # BLD AUTO: 4.44 X10 (3) UL (ref 1.5–7.7)
NEUTROPHILS # BLD AUTO: 4.44 X10(3) UL (ref 1.5–7.7)
NEUTROPHILS NFR BLD AUTO: 56.1 %
NITRITE UR QL STRIP.AUTO: NEGATIVE
NONHDLC SERPL-MCNC: 116 MG/DL (ref ?–130)
OSMOLALITY SERPL CALC.SUM OF ELEC: 282 MOSM/KG (ref 275–295)
PH UR STRIP.AUTO: 6 [PH] (ref 5–8)
PLATELET # BLD AUTO: 238 10(3)UL (ref 150–450)
POTASSIUM SERPL-SCNC: 4.1 MMOL/L (ref 3.5–5.1)
PROT SERPL-MCNC: 8.1 G/DL (ref 6.4–8.2)
PROT UR STRIP.AUTO-MCNC: NEGATIVE MG/DL
RBC # BLD AUTO: 5 X10(6)UL
RBC UR QL AUTO: NEGATIVE
SODIUM SERPL-SCNC: 136 MMOL/L (ref 136–145)
SP GR UR STRIP.AUTO: 1.01 (ref 1–1.03)
TRIGL SERPL-MCNC: 70 MG/DL (ref 30–149)
UROBILINOGEN UR STRIP.AUTO-MCNC: <2 MG/DL
VLDLC SERPL CALC-MCNC: 12 MG/DL (ref 0–30)
WBC # BLD AUTO: 7.9 X10(3) UL (ref 4–11)

## 2022-05-23 PROCEDURE — 3075F SYST BP GE 130 - 139MM HG: CPT | Performed by: FAMILY MEDICINE

## 2022-05-23 PROCEDURE — 80053 COMPREHEN METABOLIC PANEL: CPT | Performed by: FAMILY MEDICINE

## 2022-05-23 PROCEDURE — 3008F BODY MASS INDEX DOCD: CPT | Performed by: FAMILY MEDICINE

## 2022-05-23 PROCEDURE — 3079F DIAST BP 80-89 MM HG: CPT | Performed by: FAMILY MEDICINE

## 2022-05-23 PROCEDURE — 99396 PREV VISIT EST AGE 40-64: CPT | Performed by: FAMILY MEDICINE

## 2022-05-23 PROCEDURE — 81003 URINALYSIS AUTO W/O SCOPE: CPT | Performed by: FAMILY MEDICINE

## 2022-05-23 PROCEDURE — 80061 LIPID PANEL: CPT | Performed by: FAMILY MEDICINE

## 2022-05-23 PROCEDURE — 85025 COMPLETE CBC W/AUTO DIFF WBC: CPT | Performed by: FAMILY MEDICINE

## 2022-05-23 PROCEDURE — 84153 ASSAY OF PSA TOTAL: CPT | Performed by: FAMILY MEDICINE

## 2022-09-22 ENCOUNTER — TELEPHONE (OUTPATIENT)
Dept: FAMILY MEDICINE CLINIC | Facility: CLINIC | Age: 52
End: 2022-09-22

## 2022-09-27 ENCOUNTER — MED REC SCAN ONLY (OUTPATIENT)
Dept: FAMILY MEDICINE CLINIC | Facility: CLINIC | Age: 52
End: 2022-09-27

## 2022-10-28 ENCOUNTER — PATIENT MESSAGE (OUTPATIENT)
Dept: FAMILY MEDICINE CLINIC | Facility: CLINIC | Age: 52
End: 2022-10-28

## 2022-10-31 NOTE — TELEPHONE ENCOUNTER
From: Jonathan Coreas  To: Macky Collet, MD  Sent: 10/28/2022 1:58 PM CDT  Subject: Prescription    I have been taking the fluoxetine 20 mg cap from my psychiatrist.   She has moved me off her active list as i am just on maintenance dose of the above. They told me to talk to my primary care dr to get the refilled. Can you call in a prescription for the above?   Please let me know    Thanks

## 2022-10-31 NOTE — TELEPHONE ENCOUNTER
Okay to refill the fluoxetine. Please tell the patient that if we are to prescribe the medication for him we need to see him twice a year. See if we can get him on the schedule for December.

## 2023-05-02 RX ORDER — ACYCLOVIR 200 MG/1
CAPSULE ORAL
Qty: 25 CAPSULE | Refills: 0 | Status: SHIPPED | OUTPATIENT
Start: 2023-05-02

## 2023-07-30 DIAGNOSIS — F41.8 ANXIETY WITH DEPRESSION: ICD-10-CM

## 2023-07-31 NOTE — TELEPHONE ENCOUNTER
[place]:96/[birth year]/4430        NOV:was due in June. No future appointments scheduled. Medication:     FLUoxetine 20 MG Oral Cap 90 capsule 1 12/22/2022    Sig:   Take 1 capsule (20 mg total) by mouth daily. [facility] sent to pt. To call and make an appt.

## 2023-08-03 ENCOUNTER — MED REC SCAN ONLY (OUTPATIENT)
Dept: FAMILY MEDICINE CLINIC | Facility: CLINIC | Age: 53
End: 2023-08-03

## 2023-08-03 RX ORDER — FLUOXETINE HYDROCHLORIDE 20 MG/1
20 CAPSULE ORAL DAILY
Qty: 30 CAPSULE | Refills: 0 | OUTPATIENT
Start: 2023-08-03

## 2023-09-22 ENCOUNTER — TELEPHONE (OUTPATIENT)
Dept: FAMILY MEDICINE CLINIC | Facility: CLINIC | Age: 53
End: 2023-09-22

## 2023-09-25 RX ORDER — BUSPIRONE HYDROCHLORIDE 15 MG/1
15 TABLET ORAL DAILY
Qty: 30 TABLET | Refills: 0 | OUTPATIENT
Start: 2023-09-25

## 2023-09-25 RX ORDER — FLUOXETINE HYDROCHLORIDE 20 MG/1
20 CAPSULE ORAL DAILY
Qty: 30 CAPSULE | Refills: 0 | OUTPATIENT
Start: 2023-09-25

## 2023-09-25 NOTE — TELEPHONE ENCOUNTER
Please call pt to schedule a follow up on meds prior to refill. Last seen was 2023 by Dr. Luna Servin and was advised to follow up in 6 months. Over due for appt. Thank you. LOV:  2022 for Anxiety    LRF:    Medication Quantity Refills Start End   FLUoxetine 20 MG Oral Cap 90 capsule 1 2022    Sig:   Take 1 capsule (20 mg total) by mouth daily. Route:   Oral       Medication Quantity Refills Start End   BusPIRone HCl 15 MG Oral Tab (Discontinued)   2012   Si Tab daily.      Patient not taking:   Reported on 2022     Route:   (none)     Reason for Discontinue:   Patient discontinued

## 2023-09-25 NOTE — TELEPHONE ENCOUNTER
Pt requesting refills for    FLUoxetine 20 MG Oral Cap     BusPIRone HCl 15 MG Oral Tab     Pt also requesting prescription for alprazolam for a trip he is taking this week. I scheduled Pt with Dr. Dorothy Diallo for tomorrow 9/26.   Pt out of other meds

## 2023-09-26 ENCOUNTER — OFFICE VISIT (OUTPATIENT)
Dept: FAMILY MEDICINE CLINIC | Facility: CLINIC | Age: 53
End: 2023-09-26
Payer: COMMERCIAL

## 2023-09-26 VITALS
HEIGHT: 70 IN | SYSTOLIC BLOOD PRESSURE: 126 MMHG | HEART RATE: 95 BPM | DIASTOLIC BLOOD PRESSURE: 64 MMHG | BODY MASS INDEX: 44.09 KG/M2 | RESPIRATION RATE: 20 BRPM | WEIGHT: 308 LBS | TEMPERATURE: 98 F

## 2023-09-26 DIAGNOSIS — E66.01 MORBID OBESITY DUE TO EXCESS CALORIES (HCC): Primary | ICD-10-CM

## 2023-09-26 DIAGNOSIS — Z00.00 HEALTHCARE MAINTENANCE: ICD-10-CM

## 2023-09-26 DIAGNOSIS — F41.9 ANXIETY: ICD-10-CM

## 2023-09-26 PROCEDURE — 3008F BODY MASS INDEX DOCD: CPT | Performed by: FAMILY MEDICINE

## 2023-09-26 PROCEDURE — 3074F SYST BP LT 130 MM HG: CPT | Performed by: FAMILY MEDICINE

## 2023-09-26 PROCEDURE — 3078F DIAST BP <80 MM HG: CPT | Performed by: FAMILY MEDICINE

## 2023-09-26 PROCEDURE — 99214 OFFICE O/P EST MOD 30 MIN: CPT | Performed by: FAMILY MEDICINE

## 2023-09-26 RX ORDER — BUSPIRONE HYDROCHLORIDE 15 MG/1
15 TABLET ORAL DAILY
Qty: 30 TABLET | Refills: 3 | Status: SHIPPED | OUTPATIENT
Start: 2023-09-26

## 2023-09-26 RX ORDER — FLUOXETINE HYDROCHLORIDE 20 MG/1
20 CAPSULE ORAL DAILY
Qty: 90 CAPSULE | Refills: 1 | Status: SHIPPED | OUTPATIENT
Start: 2023-09-26

## 2023-09-26 RX ORDER — MOMETASONE FUROATE 220 UG/1
INHALANT RESPIRATORY (INHALATION)
COMMUNITY
End: 2023-09-26 | Stop reason: ALTCHOICE

## 2023-09-26 RX ORDER — POLYETHYLENE GLYCOL-3350 AND ELECTROLYTES 236; 6.74; 5.86; 2.97; 22.74 G/274.31G; G/274.31G; G/274.31G; G/274.31G; G/274.31G
4000 POWDER, FOR SOLUTION ORAL ONCE
COMMUNITY
End: 2023-09-26 | Stop reason: ALTCHOICE

## 2023-09-26 RX ORDER — DOXYCYCLINE 100 MG/1
100 CAPSULE ORAL 2 TIMES DAILY
COMMUNITY
End: 2023-09-26 | Stop reason: ALTCHOICE

## 2023-09-26 RX ORDER — INFLUENZA VIRUS VACCINE 15; 15; 15; 15 UG/.5ML; UG/.5ML; UG/.5ML; UG/.5ML
0.5 SUSPENSION INTRAMUSCULAR ONCE
COMMUNITY
End: 2023-09-26 | Stop reason: ALTCHOICE

## 2023-09-26 RX ORDER — ALPRAZOLAM 0.25 MG/1
1 TABLET ORAL 2 TIMES DAILY PRN
COMMUNITY
End: 2023-09-26

## 2023-09-26 RX ORDER — TRIAMCINOLONE ACETONIDE 1 MG/G
CREAM TOPICAL
COMMUNITY
Start: 2023-07-19 | End: 2023-09-26 | Stop reason: ALTCHOICE

## 2023-09-26 RX ORDER — BUSPIRONE HYDROCHLORIDE 15 MG/1
1 TABLET ORAL 2 TIMES DAILY
COMMUNITY
End: 2023-09-26

## 2023-09-26 RX ORDER — CLONAZEPAM 0.5 MG/1
0.5 TABLET ORAL AS NEEDED
Qty: 30 TABLET | Refills: 0 | Status: SHIPPED | OUTPATIENT
Start: 2023-09-26

## 2023-11-13 RX ORDER — ACYCLOVIR 200 MG/1
CAPSULE ORAL
Qty: 25 CAPSULE | Refills: 0 | Status: SHIPPED | OUTPATIENT
Start: 2023-11-13

## 2024-03-23 ENCOUNTER — APPOINTMENT (OUTPATIENT)
Dept: GENERAL RADIOLOGY | Age: 54
End: 2024-03-23
Attending: PHYSICIAN ASSISTANT
Payer: COMMERCIAL

## 2024-03-23 ENCOUNTER — HOSPITAL ENCOUNTER (OUTPATIENT)
Age: 54
Discharge: HOME | End: 2024-03-23
Payer: COMMERCIAL

## 2024-03-23 VITALS
TEMPERATURE: 99 F | DIASTOLIC BLOOD PRESSURE: 80 MMHG | RESPIRATION RATE: 22 BRPM | SYSTOLIC BLOOD PRESSURE: 128 MMHG | OXYGEN SATURATION: 95 % | HEART RATE: 84 BPM

## 2024-03-23 DIAGNOSIS — J40 BRONCHITIS: Primary | ICD-10-CM

## 2024-03-23 PROCEDURE — 71046 X-RAY EXAM CHEST 2 VIEWS: CPT | Performed by: PHYSICIAN ASSISTANT

## 2024-03-23 PROCEDURE — 99203 OFFICE O/P NEW LOW 30 MIN: CPT | Performed by: PHYSICIAN ASSISTANT

## 2024-03-23 PROCEDURE — 94640 AIRWAY INHALATION TREATMENT: CPT | Performed by: PHYSICIAN ASSISTANT

## 2024-03-23 RX ORDER — ALBUTEROL SULFATE 2.5 MG/3ML
2.5 SOLUTION RESPIRATORY (INHALATION) ONCE
Status: COMPLETED | OUTPATIENT
Start: 2024-03-23 | End: 2024-03-23

## 2024-03-23 RX ORDER — ALBUTEROL SULFATE 90 UG/1
2 AEROSOL, METERED RESPIRATORY (INHALATION) EVERY 4 HOURS PRN
Qty: 1 EACH | Refills: 0 | Status: SHIPPED | OUTPATIENT
Start: 2024-03-23 | End: 2024-04-22

## 2024-03-23 RX ORDER — METHYLPREDNISOLONE 4 MG/1
TABLET ORAL
Qty: 1 EACH | Refills: 0 | Status: SHIPPED | OUTPATIENT
Start: 2024-03-23

## 2024-03-23 RX ORDER — LORATADINE 10 MG/1
10 TABLET, ORALLY DISINTEGRATING ORAL DAILY
COMMUNITY

## 2024-03-23 NOTE — DISCHARGE INSTRUCTIONS
Use the inhaler as needed for chest tightness or wheezing.  Steroids as directed.  Bryan Landa for cough management.    Follow up with your primary doctor.     If you have new, changing or worsening symptoms, please go directly to the ER.

## 2024-03-23 NOTE — ED PROVIDER NOTES
Patient Seen in: [facility]      History     Chief Complaint   Patient presents with    Cough     Entered by patient     Stated Complaint: Cough    Subjective:   HPI    CHIEF COMPLAINT: Cough for the last 4-week     HISTORY OF PRESENT ILLNESS: Patient is a 53-year-old male who presents for evaluation of cough for the last 4 weeks.  States that at the onset he had symptoms of a head cold.  Those symptoms have resolved and now he has chest congestion.  Cough is only occasionally productive.  Feels some tightness in the chest, but no cem chest pain.  No shortness of breath.  Denies any history of asthma or reactive airway disease.  States has had pneumonia couple of times but the last time was more than 5 years ago.  Otherwise feeling well.     REVIEW OF SYSTEMS:  Constitutional: no fever, no chills  Eyes: no discharge  ENT: no sore throat  Cardiovascular: no chest pain, no palpitations  Respiratory: As above  Gastrointestinal: no abdominal pain, no vomiting  Genitourinary: no hematuria  Musculoskeletal: no back pain  Skin: no rashes  Neurological: no headache     Otherwise a complete review of systems was obtained and other than the HPI was negative     The patient's medication list, past medical history and social history elements is as listed in today's nurse's notes are reviewed and agree. The patient's family history is reviewed and is noncontributory to the presenting problem, except as indicated as above.    Objective:   Past Medical History:   Diagnosis Date    Anxiety state, unspecified     Obesity, unspecified     Unspecified sleep apnea               Past Surgical History:   Procedure Laterality Date    APPENDECTOMY  1998    KNEE SCOPE/SURG/INCOND FX AID+FIXAT  1985    knee arthroscopy                Social History     Socioeconomic History    Marital status:    Tobacco Use    Smoking status: Never    Smokeless tobacco: Never   Substance and Sexual Activity    Alcohol use: Yes      Alcohol/week: 0.0 standard drinks of alcohol     Comment: 6 drinks a week     Drug use: No   Other Topics Concern    Caffeine Concern No    Exercise No              Review of Systems    Positive for stated complaint: Cough  Other systems are as noted in HPI.  Constitutional and vital signs reviewed.      All other systems reviewed and negative except as noted above.    Physical Exam     ED Triage Vitals [03/23/24 1057]   /80   Pulse 84   Resp 22   Temp 98.5 °F (36.9 °C)   Temp src Oral   SpO2 95 %   O2 Device None (Room air)       Current:/80   Pulse 84   Temp 98.5 °F (36.9 °C) (Oral)   Resp 22   SpO2 95%         Physical Exam    Vital signs and nursing notes reviewed  General Appearance: Patient is alert and oriented x4 in no acute distress  Eyes: pupils equal and round, reactive to light. No pallor or injection, no sclera icterus  Ears: Bilateral TMs and canals clear  Throat: There is no erythema or exudates, no tonsillar hypertrophy.  no trismus or stridor. Uvula midline. No phonation changes, patient handling secretions well. Mucous membranes moist.  Respiratory: there are no retractions, lungs are clear to auscultation but diminished breath sounds bilaterally  Cardiovascular: regular rate and rhythm  Neurological:  Grossly intact, no deficits.  Gait normal.  Skin:  warm and dry, no rashes.  No jaundice. Brisk capillary refill  Musculoskeletal: neck is supple, no lymphadenopathy, non tender. no meningeal signs.  Extremities are symmetrical, full range of motion  Psychiatric: calm and cooperative    After neb: CTAB, moving air well.  RRR.  ED Course   Labs Reviewed - No data to display          XR CHEST PA + LAT CHEST (CPT=71046)    Result Date: 3/23/2024  PROCEDURE:  XR CHEST PA + LAT CHEST (CPT=71046)  INDICATIONS:  Cough  COMPARISON:  [facility] , XR, XR RIBS WITH CHEST (3 VIEWS), RIGHT  (CPT=71101), 2/28/2016, 7:04 PM.  TECHNIQUE:  PA and lateral chest radiographs were obtained.  PATIENT  STATED HISTORY: (As transcribed by Technologist)  Pt has had a cough for the past 4 weeks. He states the cough causes muscle aches.    FINDINGS:  The cardiomediastinal silhouette is within normal limits.  There is no consolidation, effusion, or pneumothorax.  No aggressive osseous lesions are identified.            CONCLUSION: No acute cardiopulmonary abnormality.   LOCATION:  [facility]   Dictated by (CST): Santos Villasenor MD on 3/23/2024 at 12:09 PM     Finalized by (CST): Santos Villasenor MD on 3/23/2024 at 12:09 PM        I personally reviewed the chest x-ray images.  No focal consolidation appreciated.      Differential diagnosis is viral URI, postviral cough syndrome, bronchitis, pneumonia         MDM          This is a well-appearing 53-year-old male who presents for evaluation of cough for the last 4 weeks.  X-ray was negative for consolidation.  He received an albuterol neb here which alleviated his chest tightness.  Will send home with ProAir inhaler, Medrol Dosepak.  He declined prescription cough medication.  Close follow-up with primary care.  If there are any new, changing or worsening symptoms go to the ER.  Patient voiced understanding to the treatment plan.  All questions answered                               Medical Decision Making  Amount and/or Complexity of Data Reviewed  Radiology: ordered and independent interpretation performed. Decision-making details documented in ED Course.    Risk  Prescription drug management.        Disposition and Plan     Clinical Impression:  1. Bronchitis         Disposition:  Discharge  3/23/2024 12:25 pm    Follow-up:  Gopal Telles MD  [address] [place] [id number]  [phone]    In 3 days  If symptoms worsen          Medications Prescribed:  Discharge Medication List as of 3/23/2024 12:30 PM        START taking these medications    Details   methylPREDNISolone (MEDROL) 4 MG Oral Tablet Therapy Pack Dosepack: take as directed, Normal, Disp-1  each, R-0      albuterol 108 (90 Base) MCG/ACT Inhalation Aero Soln Inhale 2 puffs into the lungs every 4 (four) hours as needed for Wheezing., Normal, Disp-1 each, R-0

## 2024-03-23 NOTE — ED INITIAL ASSESSMENT (HPI)
Pt has had  cough for the past 4 weeks, he started with a URI but cough is now just annoying and causes muscle aches and laryngitis.

## 2024-06-14 RX ORDER — FLUOXETINE HYDROCHLORIDE 20 MG/1
20 CAPSULE ORAL DAILY
Qty: 30 CAPSULE | Refills: 0 | Status: SHIPPED | OUTPATIENT
Start: 2024-06-14

## 2024-08-01 NOTE — TELEPHONE ENCOUNTER
Did not pass protocol  Last office visit  9/26/2023  Last refill was: , 6/14/2024__tabs  Next appointment: 8/14/2024    Please sign pended medication if appropriate            Medication Quantity Refills Start End   FLUOXETINE 20 MG Oral Cap 30 capsule 0 6/14/2024 --   Sig:   Take 1 capsule (20 mg total) by mouth daily.     Route:   Oral

## 2024-08-02 RX ORDER — FLUOXETINE HYDROCHLORIDE 20 MG/1
20 CAPSULE ORAL DAILY
Qty: 30 CAPSULE | Refills: 0 | Status: SHIPPED | OUTPATIENT
Start: 2024-08-02

## 2024-10-03 NOTE — TELEPHONE ENCOUNTER
Did not pass protocol  Last office visit 9/26/2023  Last refill was: , __tabs  Next appointment: 10/22/2024    Please sign pended medication if appropriate     Medication Quantity Refills Start End   acyclovir 200 MG Oral Cap 25 capsule 0 11/13/2023 --   Sig:   TAKE 1 CAPSULE (200 MG TOTAL) BY MOUTH 5 (FIVE) TIMES DAILY.     Route:   (none)         Medication Quantity Refills Start End   FLUoxetine 20 MG Oral Cap 30 capsule 0 8/2/2024 --   Sig:   Take 1 capsule (20 mg total) by mouth daily.     Route:   Oral

## 2024-10-04 RX ORDER — ACYCLOVIR 200 MG/1
CAPSULE ORAL
Qty: 25 CAPSULE | Refills: 0 | Status: SHIPPED | OUTPATIENT
Start: 2024-10-04

## 2025-03-04 ENCOUNTER — TELEPHONE (OUTPATIENT)
Dept: FAMILY MEDICINE CLINIC | Facility: CLINIC | Age: 55
End: 2025-03-04

## 2025-03-04 DIAGNOSIS — R53.83 FATIGUE, UNSPECIFIED TYPE: ICD-10-CM

## 2025-03-04 DIAGNOSIS — Z12.5 SPECIAL SCREENING FOR MALIGNANT NEOPLASM OF PROSTATE: ICD-10-CM

## 2025-03-04 DIAGNOSIS — Z01.89 ENCOUNTER FOR ROUTINE LABORATORY TESTING: Primary | ICD-10-CM

## 2025-03-04 NOTE — TELEPHONE ENCOUNTER
Patient requesting lab orders for physical to be done before next appointment on 3/20/25. Patient also requesting order for testosterone lab

## 2025-03-15 ENCOUNTER — TELEPHONE (OUTPATIENT)
Dept: FAMILY MEDICINE CLINIC | Facility: CLINIC | Age: 55
End: 2025-03-15

## 2025-03-17 NOTE — TELEPHONE ENCOUNTER
Patient called back, requesting for a refill. Informed the patient that he needs to be seen by Dr. Telles first. He is scheduled on 3/20/2025. Patient understood. Last office visit  9/26/2023.

## 2025-03-17 NOTE — TELEPHONE ENCOUNTER
Patient would like update on refill. Medication request was denied and patient is aware duplicate request have been sent over. Patient would like to know if medication can be sent to pharmacy. Please advise.

## 2025-03-17 NOTE — TELEPHONE ENCOUNTER
Chart reviewed. Please refer to \"Refill\" encounter on 3/10/25.     Message says:   \"Reason for Refusal: Appt required, please call patient   Refused By: Gopal Telles MD  \"     Upcoming appointment on 3/20/25, but patient needs to have labs done. Last labs in chart from 5/23/22.      Tried calling to inform patient. No answer. Left message to call back.

## 2025-03-19 ENCOUNTER — MED REC SCAN ONLY (OUTPATIENT)
Dept: FAMILY MEDICINE CLINIC | Facility: CLINIC | Age: 55
End: 2025-03-19

## 2025-03-20 ENCOUNTER — OFFICE VISIT (OUTPATIENT)
Dept: FAMILY MEDICINE CLINIC | Facility: CLINIC | Age: 55
End: 2025-03-20
Payer: COMMERCIAL

## 2025-03-20 VITALS
WEIGHT: 315 LBS | RESPIRATION RATE: 18 BRPM | HEIGHT: 70 IN | TEMPERATURE: 98 F | SYSTOLIC BLOOD PRESSURE: 134 MMHG | HEART RATE: 80 BPM | DIASTOLIC BLOOD PRESSURE: 82 MMHG | BODY MASS INDEX: 45.1 KG/M2

## 2025-03-20 DIAGNOSIS — Z00.00 HEALTHCARE MAINTENANCE: Primary | ICD-10-CM

## 2025-03-20 DIAGNOSIS — F41.9 ANXIETY: ICD-10-CM

## 2025-03-20 PROCEDURE — 99214 OFFICE O/P EST MOD 30 MIN: CPT | Performed by: FAMILY MEDICINE

## 2025-03-20 PROCEDURE — 99396 PREV VISIT EST AGE 40-64: CPT | Performed by: FAMILY MEDICINE

## 2025-03-20 PROCEDURE — 3075F SYST BP GE 130 - 139MM HG: CPT | Performed by: FAMILY MEDICINE

## 2025-03-20 PROCEDURE — 3008F BODY MASS INDEX DOCD: CPT | Performed by: FAMILY MEDICINE

## 2025-03-20 PROCEDURE — 3079F DIAST BP 80-89 MM HG: CPT | Performed by: FAMILY MEDICINE

## 2025-03-20 RX ORDER — TRIAMCINOLONE ACETONIDE 1 MG/G
CREAM TOPICAL 2 TIMES DAILY
COMMUNITY

## 2025-03-20 RX ORDER — ALBUTEROL SULFATE 90 UG/1
2 INHALANT RESPIRATORY (INHALATION) EVERY 4 HOURS PRN
COMMUNITY
End: 2025-03-20

## 2025-03-20 RX ORDER — CLONAZEPAM 0.5 MG/1
0.5 TABLET ORAL AS NEEDED
Qty: 30 TABLET | Refills: 0 | Status: SHIPPED | OUTPATIENT
Start: 2025-03-20

## 2025-03-20 RX ORDER — METHYLPREDNISOLONE 4 MG/1
4 TABLET ORAL
COMMUNITY
End: 2025-03-20

## 2025-05-06 DIAGNOSIS — F41.9 ANXIETY: ICD-10-CM

## 2025-05-07 RX ORDER — BUSPIRONE HYDROCHLORIDE 15 MG/1
15 TABLET ORAL DAILY
Qty: 30 TABLET | Refills: 0 | OUTPATIENT
Start: 2025-05-07

## 2025-05-07 NOTE — TELEPHONE ENCOUNTER
Requested Prescriptions     Pending Prescriptions Disp Refills    BUSPIRONE 15 MG Oral Tab [Pharmacy Med Name: Buspirone Hcl 15 Mg Tab Teva] 30 tablet 0     Sig: Take 1 tablet (15 mg total) by mouth daily.       The original prescription was discontinued on 3/20/2025 by Gopal Telles MD for the following reason: Physician directed. Renewing this prescription may not be appropriate.   Refill denied.

## (undated) NOTE — Clinical Note
I had the pleasure of seeing Mr. Niya Mercado in my office today. Please see my attached note.       Ananda Treadwell

## (undated) NOTE — MR AVS SNAPSHOT
[facility], [address] [id number] [phone]               Thank you for choosing us for your health care visit with Evangelist Palacios DO.   We are glad to serve you and happy to provide you with this s not need to be treated with antibiotics. Until you receive the results of the strep test, you should stay home from work. If your child is being tested, he or she should stay home from school. Home care  · Rest at home.  Drink plenty of fluids so you won't Follow up with your healthcare provider or our staff if you don't get better over the next week. When to seek medical advice  Call your healthcare provider right away if any of these occur:  · Fever as directed by your healthcare provider.  For children, s MULTIVITAMINS OR   Take  by mouth.                    Results of Recent Testing     STREP A ASSAY W/OPTIC      Component Value Standard Range & Units    STREP GRP A CUL-SCR neg Negative    Control Line Present with a clear background (yes/no) yes Yes/No